# Patient Record
Sex: MALE | Race: WHITE | URBAN - NONMETROPOLITAN AREA
[De-identification: names, ages, dates, MRNs, and addresses within clinical notes are randomized per-mention and may not be internally consistent; named-entity substitution may affect disease eponyms.]

---

## 2019-02-13 LAB
ALBUMIN SERPL-MCNC: NORMAL G/DL
ALP BLD-CCNC: NORMAL U/L
ALT SERPL-CCNC: NORMAL U/L
ANION GAP SERPL CALCULATED.3IONS-SCNC: NORMAL MMOL/L
AST SERPL-CCNC: NORMAL U/L
BILIRUB SERPL-MCNC: NORMAL MG/DL
BUN BLDV-MCNC: NORMAL MG/DL
CALCIUM SERPL-MCNC: NORMAL MG/DL
CHLORIDE BLD-SCNC: NORMAL MMOL/L
CHOLESTEROL, TOTAL: NORMAL
CHOLESTEROL/HDL RATIO: NORMAL
CO2: NORMAL
CREAT SERPL-MCNC: NORMAL MG/DL
GFR CALCULATED: NORMAL
GLUCOSE BLD-MCNC: NORMAL MG/DL
HDLC SERPL-MCNC: NORMAL MG/DL
LDL CHOLESTEROL CALCULATED: NORMAL
POTASSIUM SERPL-SCNC: NORMAL MMOL/L
SODIUM BLD-SCNC: NORMAL MMOL/L
TOTAL PROTEIN: NORMAL
TRIGL SERPL-MCNC: NORMAL MG/DL
VLDLC SERPL CALC-MCNC: NORMAL MG/DL

## 2020-02-17 ENCOUNTER — CLINICAL DOCUMENTATION (OUTPATIENT)
Dept: FAMILY MEDICINE CLINIC | Age: 65
End: 2020-02-17

## 2020-02-17 NOTE — PROGRESS NOTES
Arben Mayorga Given : 1955 Sex: M Age: 58 years  Note created from [address] [place] [address] for Dr. Basurto Seat: Patient presents complete preventative examination. Recent extrinsic fall with rib fracture. Is doing a  lot better with those symptoms. Very minimal amount of chest wall discomfort. Denies any breathing  problems including shortness of breath cough or sputum production. He denies any hemoptysis. He  does have BPH symptomatology which is unchanged. Nocturia x1 or 2 at nighttime. No dysuria or  hematuria. No difficulty starting or stopping his stream. Patient's minimal amount of osteoarthritis of his  knees. Previously it was bothering him more but he was intentionally lost about 8 pounds over the last  year and this has been helpful. I encouraged more weight loss. Patient is getting close to [facility] and  we discussed post-[facility] plans. Patient refuses influenza vaccination but he does agree to a  Shingrix vaccination as his mother's recently suffer from shingles which has been very painful for her. HPI:  ROS:  Const: General health stated as excellent. Eyes: Denies eye symptoms. ENMT: Denies ear symptoms. Denies nasal symptoms. CV: Denies cardiovascular symptoms. Resp: Denies respiratory symptoms. GI: Denies gastrointestinal symptoms. : Minimal BPH symptomatology  Musculo: Reports arthritis. occasional knee pain especially with working on his knees such as crawling in  a duct. Skin: Skin care every 6 months by dermatology. Neuro: Denies neurologic symptoms. Psych: Denies psychiatric symptoms. Endocrine: Denies endocrine symptoms. Hema/Lymph: Denies hematologic symptoms. Denies lymphatic symptoms.   Current Meds: Doterra Supplement qd  Allergies: NKDA  PMH:  Problem List: Allergic contact dermatitis due to plants, except food  Health Maintenance:  Tetanus Immunization - (2019)  Colonoscopy - (2011)  Colonoscopy Screening - (2011)  Prostate Exam -

## 2020-02-19 ENCOUNTER — HOSPITAL ENCOUNTER (OUTPATIENT)
Age: 65
Discharge: HOME | End: 2020-02-21
Payer: COMMERCIAL

## 2020-02-19 ENCOUNTER — OFFICE VISIT (OUTPATIENT)
Dept: FAMILY MEDICINE CLINIC | Age: 65
End: 2020-02-19
Payer: COMMERCIAL

## 2020-02-19 VITALS
HEART RATE: 66 BPM | OXYGEN SATURATION: 93 % | BODY MASS INDEX: 28.44 KG/M2 | WEIGHT: 192 LBS | SYSTOLIC BLOOD PRESSURE: 132 MMHG | HEIGHT: 69 IN | DIASTOLIC BLOOD PRESSURE: 80 MMHG

## 2020-02-19 PROBLEM — M19.90 OSTEOARTHRITIS: Status: ACTIVE | Noted: 2020-02-19

## 2020-02-19 PROBLEM — Z80.42 FAMILY HX OF PROSTATE CANCER: Status: ACTIVE | Noted: 2020-02-19

## 2020-02-19 PROBLEM — Z12.5 PROSTATE CANCER SCREENING: Status: ACTIVE | Noted: 2020-02-19

## 2020-02-19 LAB — PROSTATE SPECIFIC ANTIGEN: 3.67 NG/ML (ref 0–4)

## 2020-02-19 PROCEDURE — 36415 COLL VENOUS BLD VENIPUNCTURE: CPT

## 2020-02-19 PROCEDURE — 99397 PER PM REEVAL EST PAT 65+ YR: CPT | Performed by: INTERNAL MEDICINE

## 2020-02-19 PROCEDURE — G0103 PSA SCREENING: HCPCS

## 2020-02-19 ASSESSMENT — PATIENT HEALTH QUESTIONNAIRE - PHQ9
SUM OF ALL RESPONSES TO PHQ QUESTIONS 1-9: 0
2. FEELING DOWN, DEPRESSED OR HOPELESS: 0
SUM OF ALL RESPONSES TO PHQ QUESTIONS 1-9: 0
1. LITTLE INTEREST OR PLEASURE IN DOING THINGS: 0
SUM OF ALL RESPONSES TO PHQ9 QUESTIONS 1 & 2: 0

## 2020-02-19 NOTE — PROGRESS NOTES
This is a patient who had told me his father had bladder cancer but in reality this was prostate cancer. PSA actually had climbed last year and so I will check this again along with repeating a prostate examination. Patient is vaccines are up-to-date but he once again refuses influenza vaccine. He really does not have a lot of symptomatology of BPH. He does have some arthritis of his knees. He has intentionally lost about 35 pounds over the last year. He broke up with a woman he was dating which was emotionally very difficult. He is denying depression because of this however. HPI:  ROS:  Const: General health stated as excellent. Eyes: Denies eye symptoms. ENMT: Denies ear symptoms. Denies nasal symptoms. CV: Denies cardiovascular symptoms. Resp: Denies respiratory symptoms. GI: Denies gastrointestinal symptoms. : Minimal BPH symptomatology  Musculo: Reports arthritis. occasional knee pain especially with working on his knees such as crawling in  a duct. Skin: Skin care every 6 months by dermatology. Neuro: Denies neurologic symptoms. Psych: Denies psychiatric symptoms. Endocrine: Denies endocrine symptoms. Hema/Lymph: Denies hematologic symptoms. Denies lymphatic symptoms. No current outpatient medications on file.   Allergies: NKDA  PMH:  Problem List: Allergic contact dermatitis due to plants, except food  Health Maintenance:  Tetanus Immunization - (1/26/2019)  Colonoscopy - (2/28/2011)  Colonoscopy Screening - (2/28/2011)  Prostate Exam - (2/19/2020))  Psa Test - (2/19/2020)  Physical Exam - (2/19/2020)  Colonoscopy - 2011 NORMAL- [facility]  Rectal Exam - (2/19/2020)  Tdap - (2010)  Shingrix Vaccine (Shingles) - (2/13/2019) [address]  Dermatology Surveillance - (2/19/2020) EVERY 6 MONTHS  Negative For Influenza Vaccination - (2/19/2020)) REFUSES  Childhood Illnesses:  Meningitis - as baby  Medical Problems:  Osteoarthritis - KNEES  Benign Prostatic Hypertrophy  Extrinsic Fall With Right Rib Fracture - (2019)  Surgical Hx:  Vasectomy  Reviewed and updated. FH:  Father:  Prostate cancer  Mother:  Obesity, Breast Cancer. Reviewed, no changes. SH:  Marital: Legal Status:  - . Personal Habits: Cigarette Use: Never Smoked Cigarettes. Alcohol: Rarely consumes alcohol. Daily  Caffeine: Consumes on average 3 cups of regular coffee per day. Exercise Type: Exercises  sporadically. Reviewed, no changes. Date: 2019  Was the patient queried about smoking behavior? Yes No  Does the patient currently smoke? Smoking: Patient has never smoked. Objective  BP: 122/80 Pulse: 72 Ht: 69\" 5'9\" Ht cm: 175.3 Wt: 222lb Wt Prior: 230lb as of 19 Wt Dif: -8lb Wt  k.699 Wt kg Prior: 104. 328 as of 19 Wt kg Dif: -3.629 BMI: 32.8 BSA: 2.16  Exam:  Const: Appears healthy, well developed and well nourished. Appears obese. Eyes: EOMI in both eyes. PERRL. ENMT: External ears WNL. Tympanic membranes are intact. External nose WNL. Moistness and  normal color of the nasal mucosae. Septum is in the midline. Dentition is in good repair. Gums  appear healthy. Posterior pharynx shows no exudate, irritation or redness. Neck: Supple and symmetric. Palpation reveals no adenopathy. No masses appreciated. Thyroid  exhibits no nodule or thyromegaly. No JVD. Resp: Respirations are unlabored. Respiration rate is normal. Auscultate good airflow. No rales,  rhonchi or wheezes appreciated over the lungs bilaterally. CV: Rhythm is regular. S1 is normal. S2 is normal. Carotids: no bruits. Abdominal aorta: not  palpable. Pedal pulses: 2+ and equal bilaterally. Extremities: No clubbing, cyanosis or edema. Abdomen: Bowel sounds are normoactive. Palpation reveals softness, with no distension,  organomegaly or tenderness. No abdominal masses palpable. No palpable hepatosplenomegaly. : Testes are normal on palpation. Prostate: Moderately enlarged, symmetric, firm and nontender. Hematest: negative.   Musculo: Walks with a normal gait. Upper Extremities: Full ROM bilaterally. Lower Extremities:  Crepitation of the lower extremities. Skin: Dry and warm with no rash. Neuro: Alert and oriented x3. Mood is normal. Affect is normal. Speech is articulate and fluent. Reflexes: DTR's are symmetric, intact and 2+ bilaterally. Psych: Patient's attitude is cooperative. Patient's affect is appropriate. Judgement is realistic. Insight  is appropriate. QUINN was seen today for annual exam.    Diagnoses and all orders for this visit:    Prostate cancer screening  -     PSA SCREENING; Future    Primary osteoarthritis of both knees    Family hx of prostate cancer  -     PSA SCREENING; Future    The patient will have PSA level today. The patient is to be seen back in 1 year. If his PSA level rises significantly then I would suggest urologic evaluation and I did discuss this with him. Patient arthritic symptoms are well controlled especially with this weight loss. I did go over his labs from last year and there is really no need to repeat lipids at this point especially with this impressive weight loss.

## 2020-03-05 ENCOUNTER — TELEPHONE (OUTPATIENT)
Dept: FAMILY MEDICINE CLINIC | Age: 65
End: 2020-03-05

## 2020-03-20 PROBLEM — Z12.5 PROSTATE CANCER SCREENING: Status: RESOLVED | Noted: 2020-02-19 | Resolved: 2020-03-20

## 2020-10-27 ENCOUNTER — OFFICE VISIT (OUTPATIENT)
Dept: FAMILY MEDICINE CLINIC | Age: 65
End: 2020-10-27
Payer: COMMERCIAL

## 2020-10-27 VITALS
TEMPERATURE: 97.8 F | HEART RATE: 85 BPM | DIASTOLIC BLOOD PRESSURE: 80 MMHG | SYSTOLIC BLOOD PRESSURE: 130 MMHG | OXYGEN SATURATION: 97 % | HEIGHT: 70 IN | WEIGHT: 211 LBS | RESPIRATION RATE: 18 BRPM | BODY MASS INDEX: 30.21 KG/M2

## 2020-10-27 PROCEDURE — 99213 OFFICE O/P EST LOW 20 MIN: CPT | Performed by: PHYSICIAN ASSISTANT

## 2020-10-27 RX ORDER — CEPHALEXIN 500 MG/1
500 CAPSULE ORAL 3 TIMES DAILY
Qty: 21 CAPSULE | Refills: 0 | Status: SHIPPED | OUTPATIENT
Start: 2020-10-27 | End: 2020-11-03
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
## 2021-03-31 ENCOUNTER — OFFICE VISIT (OUTPATIENT)
Dept: FAMILY MEDICINE CLINIC | Age: 66
End: 2021-03-31
Payer: COMMERCIAL

## 2021-03-31 VITALS
DIASTOLIC BLOOD PRESSURE: 80 MMHG | OXYGEN SATURATION: 95 % | WEIGHT: 211 LBS | BODY MASS INDEX: 30.71 KG/M2 | HEART RATE: 65 BPM | TEMPERATURE: 98.5 F | SYSTOLIC BLOOD PRESSURE: 132 MMHG

## 2021-03-31 VITALS
OXYGEN SATURATION: 95 % | BODY MASS INDEX: 31.15 KG/M2 | WEIGHT: 214 LBS | DIASTOLIC BLOOD PRESSURE: 80 MMHG | SYSTOLIC BLOOD PRESSURE: 132 MMHG | HEART RATE: 65 BPM

## 2021-03-31 DIAGNOSIS — Z80.42 FAMILY HX OF PROSTATE CANCER: ICD-10-CM

## 2021-03-31 DIAGNOSIS — M17.0 PRIMARY OSTEOARTHRITIS OF BOTH KNEES: ICD-10-CM

## 2021-03-31 DIAGNOSIS — Z00.00 ROUTINE GENERAL MEDICAL EXAMINATION AT A HEALTH CARE FACILITY: Primary | ICD-10-CM

## 2021-03-31 DIAGNOSIS — Z12.11 COLON CANCER SCREENING: ICD-10-CM

## 2021-03-31 DIAGNOSIS — Z00.00 ROUTINE GENERAL MEDICAL EXAMINATION AT A HEALTH CARE FACILITY: ICD-10-CM

## 2021-03-31 LAB
CHOLESTEROL, TOTAL: 148 MG/DL (ref 0–199)
HDLC SERPL-MCNC: 51 MG/DL
LDL CHOLESTEROL CALCULATED: 84 MG/DL (ref 0–99)
PROSTATE SPECIFIC ANTIGEN: 5.96 NG/ML (ref 0–4)
TRIGL SERPL-MCNC: 66 MG/DL (ref 0–149)
VLDLC SERPL CALC-MCNC: 13 MG/DL

## 2021-03-31 PROCEDURE — 99397 PER PM REEVAL EST PAT 65+ YR: CPT | Performed by: INTERNAL MEDICINE

## 2021-03-31 PROCEDURE — G0438 PPPS, INITIAL VISIT: HCPCS | Performed by: INTERNAL MEDICINE

## 2021-03-31 ASSESSMENT — LIFESTYLE VARIABLES
HOW MANY STANDARD DRINKS CONTAINING ALCOHOL DO YOU HAVE ON A TYPICAL DAY: 0
HOW OFTEN DO YOU HAVE SIX OR MORE DRINKS ON ONE OCCASION: 0
AUDIT TOTAL SCORE: 1
HOW OFTEN DURING THE LAST YEAR HAVE YOU FAILED TO DO WHAT WAS NORMALLY EXPECTED FROM YOU BECAUSE OF DRINKING: 0
HOW OFTEN DO YOU HAVE A DRINK CONTAINING ALCOHOL: 1
HOW OFTEN DURING THE LAST YEAR HAVE YOU BEEN UNABLE TO REMEMBER WHAT HAPPENED THE NIGHT BEFORE BECAUSE YOU HAD BEEN DRINKING: 0
HOW OFTEN DURING THE LAST YEAR HAVE YOU FOUND THAT YOU WERE NOT ABLE TO STOP DRINKING ONCE YOU HAD STARTED: 0
HAS A RELATIVE, FRIEND, DOCTOR, OR ANOTHER HEALTH PROFESSIONAL EXPRESSED CONCERN ABOUT YOUR DRINKING OR SUGGESTED YOU CUT DOWN: 0

## 2021-03-31 ASSESSMENT — PATIENT HEALTH QUESTIONNAIRE - PHQ9
SUM OF ALL RESPONSES TO PHQ QUESTIONS 1-9: 0
2. FEELING DOWN, DEPRESSED OR HOPELESS: 0
SUM OF ALL RESPONSES TO PHQ QUESTIONS 1-9: 0
SUM OF ALL RESPONSES TO PHQ9 QUESTIONS 1 & 2: 0

## 2021-03-31 NOTE — PATIENT INSTRUCTIONS
Personalized Preventive Plan for Jony Rodriguez - 3/31/2021  Medicare offers a range of preventive health benefits. Some of the tests and screenings are paid in full while other may be subject to a deductible, co-insurance, and/or copay. Some of these benefits include a comprehensive review of your medical history including lifestyle, illnesses that may run in your family, and various assessments and screenings as appropriate. After reviewing your medical record and screening and assessments performed today your provider may have ordered immunizations, labs, imaging, and/or referrals for you. A list of these orders (if applicable) as well as your Preventive Care list are included within your After Visit Summary for your review. Other Preventive Recommendations:    · A preventive eye exam performed by an eye specialist is recommended every 1-2 years to screen for glaucoma; cataracts, macular degeneration, and other eye disorders. · A preventive dental visit is recommended every 6 months. · Try to get at least 150 minutes of exercise per week or 10,000 steps per day on a pedometer . · Order or download the FREE \"Exercise & Physical Activity: Your Everyday Guide\" from [employer]. Call 8-266.674.3805 or search [employer] online. · You need 6017-7058 mg of calcium and 9661-0634 IU of vitamin D per day. It is possible to meet your calcium requirement with diet alone, but a vitamin D supplement is usually necessary to meet this goal.  · When exposed to the sun, use a sunscreen that protects against both UVA and UVB radiation with an SPF of 30 or greater. Reapply every 2 to 3 hours or after sweating, drying off with a towel, or swimming. · Always wear a seat belt when traveling in a car. Always wear a helmet when riding a bicycle or motorcycle.

## 2021-03-31 NOTE — PROGRESS NOTES
Patient's father had prostate cancer. Patient has slight BPH symptomatology including some nocturia x2. Denies any difficulty starting or stopping his stream.  Some arthritis but this is fairly minimal.  He does see dermatology every 6 months. He has had quite a lot of sun exposure. Patient's daughter just had a baby and he was questioning his Tdap status. He did have 1 January 26, 2019. Colonoscopy is due this year as the patient has been 10 years since his last examination which was normal.  I will refer him back to Dr. Rula Acuña. HPI:  ROS:  Const: General health stated as excellent. Eyes: Denies eye symptoms. ENMT: Denies ear symptoms. Denies nasal symptoms. CV: Denies cardiovascular symptoms. Resp: Denies respiratory symptoms. GI: Denies gastrointestinal symptoms. : Minimal BPH symptomatology  Musculo: Reports arthritis. occasional knee pain especially with working on his knees such as crawling in  a duct. Skin: Skin care every 6 months by dermatology. Neuro: Denies neurologic symptoms. Psych: Denies psychiatric symptoms. Endocrine: Denies endocrine symptoms. Hema/Lymph: Denies hematologic symptoms. Denies lymphatic symptoms. Current Outpatient Medications:     mupirocin (BACTROBAN) 2 % ointment, Apply 3 times daily. , Disp: 30 g, Rfl: 0  Allergies: NKDA  PMH:  Problem List: Allergic contact dermatitis due to plants, except food  Health Maintenance:  Tetanus Immunization - (1/26/2019)  Colonoscopy - (2/28/2011)  Colonoscopy Screening - (2/28/2011)  Prostate Exam - (3/31/2021)  Psa Test - (3/31/2021)  Physical Exam - (2/19/2020)  Colonoscopy - 2011 NORMAL- [facility]  Rectal Exam - (3/31/2021)  Shingrix Vaccine (Shingles) - (3/31/2021) discussed  Dermatology Surveillance - (2/19/2020) EVERY 6 MONTHS  Negative For Influenza Vaccination - (3/31/2021) REFUSES  COVID vaccine- discussed 3/31/2021  Childhood Illnesses:  Meningitis - as baby  Medical Problems:  Osteoarthritis - KNEES  Benign Prostatic Affect is normal. Speech is articulate and fluent. Reflexes: DTR's are symmetric, intact and 2+ bilaterally. Psych: Patient's attitude is cooperative. Patient's affect is appropriate. Judgement is realistic. Insight  is appropriate. Veronica Cervantes was seen today for annual exam.    Diagnoses and all orders for this visit:    Routine general medical examination at a health care facility    Primary osteoarthritis of both knees    Family hx of prostate cancer    Patient is to have lab work today. His Tdap is up-to-date in terms of his exposure to grandchildren. Referral to Dr. Boyd . Let him know the results of this testing. Patient is to be scheduled in 1 year for another preventative visit.

## 2021-03-31 NOTE — PROGRESS NOTES
Medicare Annual Wellness Visit  Name: Dameon Galindo Date: 3/31/2021   MRN: <[id number]> Sex: Male   Age: 78 y.o. Ethnicity: Non-/Non    : 1955 Race: Adelina Riedel Given is here for Medicare AWV    Screenings for behavioral, psychosocial and functional/safety risks, and cognitive dysfunction are all negative except as indicated below. These results, as well as other patient data from the [address] form, are documented in Flowsheets linked to this Encounter. No Known Allergies    Prior to Visit Medications    Medication Sig Taking? Authorizing Provider   mupirocin (BACTROBAN) 2 % ointment Apply 3 times daily. Patient not taking: Reported on 3/31/2021  SARAH Gamez III       Past Medical History:   Diagnosis Date    Osteoarthritis     knees    Rib fracture 2019    right        Past Surgical History:   Procedure Laterality Date    VASECTOMY         Family History   Problem Relation Age of Onset    Obesity Mother     Breast Cancer Mother     Cancer Father         bladder       CareTeam (Including outside providers/suppliers regularly involved in providing care):   Patient Care Team:  Dimitris Hitchcock MD as PCP - General (Internal Medicine)  Dimitris Hitchcock MD as PCP - [address] [place], [place] [address]kelsy Ramírezled Provider    Wt Readings from Last 3 Encounters:   21 211 lb (95.7 kg)   21 214 lb (97.1 kg)   10/27/20 211 lb (95.7 kg)     Vitals:    21 1519   BP: 132/80   Pulse: 65   SpO2: 95%   Weight: 214 lb (97.1 kg)     Body mass index is 31.15 kg/m². Based upon direct observation of the patient, evaluation of cognition reveals recent and remote memory intact. Patient's complete Health Risk Assessment and screening values have been reviewed and are found in Flowsheets. The following problems were reviewed today and where indicated follow up appointments were made and/or referrals ordered.     Positive Risk Factor Screenings with Interventions:          General Health and ACP:  General  In general, how would you say your health is?: Excellent  In the past 7 days, have you experienced any of the following?  New or Increased Pain, New or Increased Fatigue, Loneliness, Social Isolation, Stress or Anger?: None of These  Do you get the social and emotional support that you need?: Yes  Do you have a Living Will?: Yes  Advance Directives     Power of Carmenza Hinson Will ACP-Advance Directive ACP-Power of     Not on File Not on File Not on File Not on File      General Health Risk Interventions:  · Has living will    Health Habits/Nutrition:  Health Habits/Nutrition  Do you exercise for at least 20 minutes 2-3 times per week?: Yes  Have you lost any weight without trying in the past 3 months?: No  Do you eat only one meal per day?: No  Have you seen the dentist within the past year?: Yes     Health Habits/Nutrition Interventions:  · very strenuos job     Safety:  Safety  Do you have working smoke detectors?: Yes  Have all throw rugs been removed or fastened?: (!) No(no throw rugs)  Do you have non-slip mats or surfaces in all bathtubs/showers?: (!) No  Do all of your stairways have a railing or banister?: Yes  Are your doorways, halls and stairs free of clutter?: Yes  Do you always fasten your seatbelt when you are in a car?: Yes  Safety Interventions:  · Home safety tips provided     Personalized Preventive Plan   Current Health Maintenance Status  Immunization History   Administered Date(s) Administered    Tdap (Boostrix, Adacel) 08/31/2010, 01/26/2019        Health Maintenance   Topic Date Due    Hepatitis C screen  Never done    COVID-19 Vaccine (1) Never done    Diabetes screen  Never done    Shingles Vaccine (1 of 2) Never done    Pneumococcal 65+ years Vaccine (1 of 1 - PPSV23) Never done    Flu vaccine (1) Never done    Colon cancer screen colonoscopy  02/28/2021    Lipid screen  02/13/2024    DTaP/Tdap/Td vaccine (3 - Td) 01/26/2029    Hepatitis A vaccine  Aged Out    Hepatitis B vaccine  Aged Out    Hib vaccine  Aged Out    Meningococcal (ACWY) vaccine  Aged Out     Recommendations for [email or web address] Due: see orders and patient instructions/AVS.  . Recommended screening schedule for the next 5-10 years is provided to the patient in written form: see Patient Instructions/AVS.    There are no diagnoses linked to this encounter.

## 2021-04-01 DIAGNOSIS — Z80.42 FAMILY HX OF PROSTATE CANCER: ICD-10-CM

## 2021-04-01 DIAGNOSIS — R97.20 PSA ELEVATION: Primary | ICD-10-CM

## 2021-04-30 PROBLEM — Z00.00 ROUTINE GENERAL MEDICAL EXAMINATION AT A HEALTH CARE FACILITY: Status: RESOLVED | Noted: 2020-02-19 | Resolved: 2021-04-30

## 2021-06-17 ENCOUNTER — HOSPITAL ENCOUNTER (OUTPATIENT)
Age: 66
Discharge: HOME | End: 2021-06-19

## 2021-06-17 PROCEDURE — 88305 TISSUE EXAM BY PATHOLOGIST: CPT

## 2022-05-11 ENCOUNTER — OFFICE VISIT (OUTPATIENT)
Dept: FAMILY MEDICINE CLINIC | Age: 67
End: 2022-05-11
Payer: COMMERCIAL

## 2022-05-11 VITALS
HEART RATE: 63 BPM | OXYGEN SATURATION: 97 % | DIASTOLIC BLOOD PRESSURE: 60 MMHG | TEMPERATURE: 98.1 F | SYSTOLIC BLOOD PRESSURE: 110 MMHG | BODY MASS INDEX: 31.7 KG/M2 | WEIGHT: 217.8 LBS

## 2022-05-11 DIAGNOSIS — E66.09 CLASS 1 OBESITY DUE TO EXCESS CALORIES WITHOUT SERIOUS COMORBIDITY WITH BODY MASS INDEX (BMI) OF 31.0 TO 31.9 IN ADULT: ICD-10-CM

## 2022-05-11 DIAGNOSIS — C61 PROSTATE CANCER (HCC): ICD-10-CM

## 2022-05-11 DIAGNOSIS — Z12.11 COLON CANCER SCREENING: ICD-10-CM

## 2022-05-11 DIAGNOSIS — M17.0 PRIMARY OSTEOARTHRITIS OF BOTH KNEES: Primary | ICD-10-CM

## 2022-05-11 PROBLEM — E66.811 CLASS 1 OBESITY DUE TO EXCESS CALORIES WITHOUT SERIOUS COMORBIDITY WITH BODY MASS INDEX (BMI) OF 31.0 TO 31.9 IN ADULT: Status: ACTIVE | Noted: 2022-05-11

## 2022-05-11 LAB
ALBUMIN SERPL-MCNC: 4.3 G/DL (ref 3.5–5.2)
ALP BLD-CCNC: 97 U/L (ref 40–129)
ALT SERPL-CCNC: 20 U/L (ref 0–40)
ANION GAP SERPL CALCULATED.3IONS-SCNC: 13 MMOL/L (ref 7–16)
AST SERPL-CCNC: 20 U/L (ref 0–39)
BASOPHILS ABSOLUTE: 0.08 E9/L (ref 0–0.2)
BASOPHILS RELATIVE PERCENT: 1.4 % (ref 0–2)
BILIRUB SERPL-MCNC: 0.5 MG/DL (ref 0–1.2)
BUN BLDV-MCNC: 14 MG/DL (ref 6–23)
CALCIUM SERPL-MCNC: 9.6 MG/DL (ref 8.6–10.2)
CHLORIDE BLD-SCNC: 108 MMOL/L (ref 98–107)
CHOLESTEROL, TOTAL: 150 MG/DL (ref 0–199)
CO2: 25 MMOL/L (ref 22–29)
CREAT SERPL-MCNC: 0.9 MG/DL (ref 0.7–1.2)
EOSINOPHILS ABSOLUTE: 0.25 E9/L (ref 0.05–0.5)
EOSINOPHILS RELATIVE PERCENT: 4.3 % (ref 0–6)
GFR AFRICAN AMERICAN: >60
GFR NON-AFRICAN AMERICAN: >60 ML/MIN/1.73
GLUCOSE BLD-MCNC: 83 MG/DL (ref 74–99)
HCT VFR BLD CALC: 48.3 % (ref 37–54)
HDLC SERPL-MCNC: 55 MG/DL
HEMOGLOBIN: 15.1 G/DL (ref 12.5–16.5)
IMMATURE GRANULOCYTES #: 0.02 E9/L
IMMATURE GRANULOCYTES %: 0.3 % (ref 0–5)
LDL CHOLESTEROL CALCULATED: 86 MG/DL (ref 0–99)
LYMPHOCYTES ABSOLUTE: 1.31 E9/L (ref 1.5–4)
LYMPHOCYTES RELATIVE PERCENT: 22.4 % (ref 20–42)
MCH RBC QN AUTO: 28.3 PG (ref 26–35)
MCHC RBC AUTO-ENTMCNC: 31.3 % (ref 32–34.5)
MCV RBC AUTO: 90.6 FL (ref 80–99.9)
MONOCYTES ABSOLUTE: 0.86 E9/L (ref 0.1–0.95)
MONOCYTES RELATIVE PERCENT: 14.7 % (ref 2–12)
NEUTROPHILS ABSOLUTE: 3.33 E9/L (ref 1.8–7.3)
NEUTROPHILS RELATIVE PERCENT: 56.9 % (ref 43–80)
PDW BLD-RTO: 13.2 FL (ref 11.5–15)
PLATELET # BLD: 338 E9/L (ref 130–450)
PMV BLD AUTO: 9.3 FL (ref 7–12)
POTASSIUM SERPL-SCNC: 4.6 MMOL/L (ref 3.5–5)
PROSTATE SPECIFIC ANTIGEN: 0.84 NG/ML (ref 0–4)
RBC # BLD: 5.33 E12/L (ref 3.8–5.8)
SODIUM BLD-SCNC: 146 MMOL/L (ref 132–146)
TOTAL PROTEIN: 7.1 G/DL (ref 6.4–8.3)
TRIGL SERPL-MCNC: 47 MG/DL (ref 0–149)
VLDLC SERPL CALC-MCNC: 9 MG/DL
WBC # BLD: 5.9 E9/L (ref 4.5–11.5)

## 2022-05-11 PROCEDURE — 99214 OFFICE O/P EST MOD 30 MIN: CPT | Performed by: INTERNAL MEDICINE

## 2022-05-11 ASSESSMENT — PATIENT HEALTH QUESTIONNAIRE - PHQ9
2. FEELING DOWN, DEPRESSED OR HOPELESS: 0
SUM OF ALL RESPONSES TO PHQ9 QUESTIONS 1 & 2: 0
1. LITTLE INTEREST OR PLEASURE IN DOING THINGS: 0
SUM OF ALL RESPONSES TO PHQ QUESTIONS 1-9: 0

## 2022-05-11 ASSESSMENT — LIFESTYLE VARIABLES
HOW MANY STANDARD DRINKS CONTAINING ALCOHOL DO YOU HAVE ON A TYPICAL DAY: 1 OR 2
HOW OFTEN DO YOU HAVE A DRINK CONTAINING ALCOHOL: MONTHLY OR LESS

## 2022-05-11 NOTE — PROGRESS NOTES
Patient was diagnosed with prostate cancer last fall. He was then seen at the [facility]. He had brachytherapy done. He has not had a repeat PSA level. He does have a little bit of irritative bladder and bowel symptomatology status post brachytherapy. He does have a sense at times of incomplete emptying. He tried to back off Flomax but this worsened the problem. I told him to go back on Flomax at least temporarily. I told him that he could try to go off of it several months from now to see if he still continues to have a sensation of incomplete emptying. He has had no blood in his stool. He states occasionally when he urinates she does feel like he is going to have a bowel movement. Patient recently retired. His mother recently . He is traveling to [place] by motorcycle in the near future. He is denying cardiac or respiratory symptoms. He is very physically active. HPI:  ROS:  Const: General health stated as good. Eyes: Denies eye symptoms. ENMT: Denies ear symptoms. Denies nasal symptoms. CV: Denies cardiovascular symptoms. Resp: Denies respiratory symptoms. GI: Denies gastrointestinal symptoms. : Prostate cancer. See HPI. Radiation oncology notes reviewed from [place] clinic. Musculo: Reports arthritis. occasional knee pain especially with working on his knees such as crawling in  a duct. Skin: Skin care every 6 months by dermatology. Neuro: Denies neurologic symptoms. Psych: Denies psychiatric symptoms. Endocrine: Denies endocrine symptoms. Hema/Lymph: Denies hematologic symptoms. Denies lymphatic symptoms. No current outpatient medications on file.   Allergies: NKDA  PMH:  Problem List: Allergic contact dermatitis due to plants, except food  Health Maintenance:  Tetanus Immunization - (2019)  Colonoscopy - (2011) referred back 2022  Colonoscopy Screening - (2011)  Prostate Exam - () CC  Psa Test - (2022)  Physical Exam - (2022)  Colonoscopy -  NORMAL- LEBRON  Rectal Exam - (3/31/2021)  Shingrix Vaccine (Shingles) - (3/31/2021) discussed  Dermatology Surveillance - (2020) EVERY 6 MONTHS  Negative For Influenza Vaccination - (3/31/2021) REFUSES  COVID vaccine-   Childhood Illnesses:  Meningitis - as baby  Medical Problems:  Osteoarthritis - KNEES  Benign Prostatic Hypertrophy  Extrinsic Fall With Right Rib Fracture - (2019)  Prostate cancer-10/2021- [facility] Brachytherapy  Surgical Hx:  Vasectomy  Prostate biopsy   Reviewed and updated. FH:  Father:  Prostate cancer  Mother:  Obesity, Breast Cancer.    Reviewed, no changes. SH:  Marital: Legal Status:  - . Four children  Personal Habits: Cigarette Use: Never Smoked Cigarettes. Alcohol: Rarely consumes alcohol. Daily  Caffeine: Consumes on average 3 cups of regular coffee per day. Exercise Type: Exercises  sporadically. Physically demanding job. Reviewed, no changes. Date: 2022  Was the patient queried about smoking behavior? Yes   Does the patient currently smoke? Smoking: Patient has never smoked. Objective  Vitals:    22 0704   BP: 110/60   Pulse: 63   Temp: 98.1 °F (36.7 °C)   SpO2: 97%     Exam:  Const: Appears healthy, well developed and well nourished. Appears obese. Eyes: EOMI in both eyes. PERRL. ENMT: External ears WNL. Tympanic membranes are intact. External nose WNL. Neck: Supple and symmetric. Palpation reveals no adenopathy. No masses appreciated. Thyroid  exhibits no nodule or thyromegaly. No JVD. Resp: Respirations are unlabored. Respiration rate is normal. Auscultate good airflow. No rales,  rhonchi or wheezes appreciated over the lungs bilaterally. CV: Rhythm is regular. S1 is normal. S2 is normal. Carotids: no bruits. Abdominal aorta: not  palpable. Pedal pulses: 2+ and equal bilaterally. Extremities: No clubbing, cyanosis or edema. Abdomen: Bowel sounds are normoactive.  Palpation reveals softness, with no distension,  organomegaly or tenderness. No abdominal masses palpable. No palpable hepatosplenomegaly. Musculo: Walks with a normal gait. Upper Extremities: Full ROM bilaterally. Lower Extremities:  Crepitation of the lower extremities. Skin: Dry and warm with no rash. Neuro: Alert and oriented x3. Mood is normal. Affect is normal. Speech is articulate and fluent. Reflexes: DTR's are symmetric, intact and 2+ bilaterally. Psych: Patient's attitude is cooperative. Patient's affect is appropriate. Judgement is realistic. Insight  is appropriate. [facility] was seen today for annual exam.    Diagnoses and all orders for this visit:    Primary osteoarthritis of both knees    Prostate cancer ([facility] [address]  -     PSA, DIAGNOSTIC; Future  -     CBC with Auto Differential; Future    Class 1 obesity due to excess calories without serious comorbidity with body mass index (BMI) of 31.0 to 31.9 in adult  -     Comprehensive Metabolic Panel; Future  -     Lipid Panel; Future    Colon cancer screening  -     CBC with Auto Differential; Future  -     Amb External Referral To General Surgery    Patient is to have lab work today. This will be sent to his radiation oncologist at the [facility]. I will let him know the results of this testing. Patient has gained a substantial amount of weight over the last 2 years. I have asked him to diet over the next year to lose 10 or 15 pounds. The patient will also be referred back to Dr. Krzysztof Michael for repeat colonoscopy which is overdue.

## 2022-05-11 NOTE — PROGRESS NOTES
Medicare Annual Wellness Visit  Name: Fred Castillo Date: 2022   MRN: [id number] Sex: Male   Age: 75 y.o. Ethnicity: Non- / Non    : 1955 Race: White (non-)      Roz Rodriguez is here for Annual Exam    Screenings for behavioral, psychosocial and functional/safety risks, and cognitive dysfunction are all negative except as indicated below. These results, as well as other patient data from the [address] form, are documented in Flowsheets linked to this Encounter. No Known Allergies      Prior to Visit Medications    Not on File         Past Medical History:   Diagnosis Date    Osteoarthritis     knees    Rib fracture 2019    right        Past Surgical History:   Procedure Laterality Date    VASECTOMY           Family History   Problem Relation Age of Onset    Obesity Mother     Breast Cancer Mother     Cancer Father         bladder       CareTeam (Including outside providers/suppliers regularly involved in providing care):   Patient Care Team:  Geri Sandoval MD as PCP - General (Internal Medicine)  Geri Sandoval MD as PCP - [facility] Provider    Wt Readings from Last 3 Encounters:   22 217 lb 12.8 oz (98.8 kg)   21 211 lb (95.7 kg)   21 214 lb (97.1 kg)     Vitals:    22 0704   BP: 110/60   Pulse: 63   Temp: 98.1 °F (36.7 °C)   SpO2: 97%   Weight: 217 lb 12.8 oz (98.8 kg)     Body mass index is 31.7 kg/m². Based upon direct observation of the patient, evaluation of cognition reveals recent and remote memory intact. Patient's complete Health Risk Assessment and screening values have been reviewed and are found in Flowsheets. The following problems were reviewed today and where indicated follow up appointments were made and/or referrals ordered.     Positive Risk Factor Screenings with Interventions:            General Health and ACP:       Advance Directives     Power of  Living Will ACP-Advance Directive ACP-Power of     Not on File Not on File Not on File Not on File      General Health Risk Interventions:  · Has living will    Health Habits/Nutrition:     Physical Activity:     Days of Exercise per Week: Not on file    Minutes of Exercise per Session: Not on file              Health Habits/Nutrition Interventions:  · very strenuos job    Safety Interventions:  · Home safety tips provided     Personalized Preventive Plan   Current Health Maintenance Status  Immunization History   Administered Date(s) Administered    Tdap (Boostrix, Adacel) 08/31/2010, 01/26/2019        Health Maintenance   Topic Date Due    COVID-19 Vaccine (1) Never done    Hepatitis C screen  Never done    Shingles vaccine (1 of 2) Never done    Pneumococcal 65+ years Vaccine (1 - PCV) Never done    Colorectal Cancer Screen  02/28/2021    Depression Screen  03/31/2022    Prostate Specific Antigen (PSA) Screening or Monitoring  03/31/2022    Flu vaccine (Season Ended) 09/01/2022    Lipids  03/31/2026    DTaP/Tdap/Td vaccine (3 - Td or Tdap) 01/26/2029    Hepatitis A vaccine  Aged Out    Hepatitis B vaccine  Aged Out    Hib vaccine  Aged Out    Meningococcal (ACWY) vaccine  Aged Out     Recommendations for [employer] Due: see orders and patient instructions/AVS.  . Recommended screening schedule for the next 5-10 years is provided to the patient in written form: see Patient Instructions/AVS.    {There are no diagnoses linked to this encounter.  (Refresh or delete this SmartLink)}

## 2022-06-02 ENCOUNTER — TELEPHONE (OUTPATIENT)
Dept: FAMILY MEDICINE CLINIC | Age: 67
End: 2022-06-02

## 2023-01-24 ENCOUNTER — TELEPHONE (OUTPATIENT)
Dept: FAMILY MEDICINE CLINIC | Age: 68
End: 2023-01-24

## 2023-01-24 NOTE — TELEPHONE ENCOUNTER
Spoke with patient, still following with [facility]. He has 6mo follow up in Feb. Will call and double check with them that they want Dr Carmen Hanna to order then call back.

## 2023-01-24 NOTE — TELEPHONE ENCOUNTER
----- Message from Ryanne Guerrero sent at 1/24/2023  9:21 AM EST -----  Subject: Referral Request    Reason for referral request? PSA blood work for prostate cancer screening  Provider patient wants to be referred to(if known):     Provider Phone Number(if known): Additional Information for Provider? Please call to advise that order is   ready.   ---------------------------------------------------------------------------  --------------  Martina COOL    [phone]; OK to leave message on voicemail  ---------------------------------------------------------------------------  --------------

## 2023-02-03 LAB — PROSTATE SPECIFIC ANTIGEN: 0.2 NG/ML (ref 0–4)

## 2023-05-16 ENCOUNTER — OFFICE VISIT (OUTPATIENT)
Dept: FAMILY MEDICINE CLINIC | Age: 68
End: 2023-05-16
Payer: MEDICARE

## 2023-05-16 VITALS
HEART RATE: 71 BPM | TEMPERATURE: 97.8 F | OXYGEN SATURATION: 98 % | WEIGHT: 228 LBS | BODY MASS INDEX: 33.19 KG/M2 | SYSTOLIC BLOOD PRESSURE: 120 MMHG | DIASTOLIC BLOOD PRESSURE: 80 MMHG

## 2023-05-16 DIAGNOSIS — R71.8 MICROCYTOSIS: ICD-10-CM

## 2023-05-16 DIAGNOSIS — M17.0 PRIMARY OSTEOARTHRITIS OF BOTH KNEES: Primary | ICD-10-CM

## 2023-05-16 DIAGNOSIS — Z00.00 MEDICARE ANNUAL WELLNESS VISIT, SUBSEQUENT: Primary | ICD-10-CM

## 2023-05-16 DIAGNOSIS — C61 PROSTATE CANCER (HCC): ICD-10-CM

## 2023-05-16 DIAGNOSIS — Z00.00 ENCOUNTER FOR SUBSEQUENT ANNUAL WELLNESS VISIT IN MEDICARE PATIENT: ICD-10-CM

## 2023-05-16 DIAGNOSIS — E66.09 CLASS 1 OBESITY DUE TO EXCESS CALORIES WITHOUT SERIOUS COMORBIDITY WITH BODY MASS INDEX (BMI) OF 31.0 TO 31.9 IN ADULT: ICD-10-CM

## 2023-05-16 DIAGNOSIS — Z00.00 WELCOME TO MEDICARE PREVENTIVE VISIT: ICD-10-CM

## 2023-05-16 LAB
BASOPHILS # BLD: 0.09 E9/L (ref 0–0.2)
BASOPHILS NFR BLD: 1.4 % (ref 0–2)
EOSINOPHIL # BLD: 0.23 E9/L (ref 0.05–0.5)
EOSINOPHIL NFR BLD: 3.5 % (ref 0–6)
ERYTHROCYTE [DISTWIDTH] IN BLOOD BY AUTOMATED COUNT: 13.7 FL (ref 11.5–15)
HCT VFR BLD AUTO: 48.8 % (ref 37–54)
HGB BLD-MCNC: 15 G/DL (ref 12.5–16.5)
IMM GRANULOCYTES # BLD: 0.03 E9/L
IMM GRANULOCYTES NFR BLD: 0.5 % (ref 0–5)
LYMPHOCYTES # BLD: 1.41 E9/L (ref 1.5–4)
LYMPHOCYTES NFR BLD: 21.5 % (ref 20–42)
MCH RBC QN AUTO: 28.2 PG (ref 26–35)
MCHC RBC AUTO-ENTMCNC: 30.7 % (ref 32–34.5)
MCV RBC AUTO: 91.7 FL (ref 80–99.9)
MONOCYTES # BLD: 0.84 E9/L (ref 0.1–0.95)
MONOCYTES NFR BLD: 12.8 % (ref 2–12)
NEUTROPHILS # BLD: 3.95 E9/L (ref 1.8–7.3)
NEUTS SEG NFR BLD: 60.3 % (ref 43–80)
PLATELET # BLD AUTO: 364 E9/L (ref 130–450)
PMV BLD AUTO: 9.2 FL (ref 7–12)
RBC # BLD AUTO: 5.32 E12/L (ref 3.8–5.8)
WBC # BLD: 6.6 E9/L (ref 4.5–11.5)

## 2023-05-16 PROCEDURE — 99214 OFFICE O/P EST MOD 30 MIN: CPT | Performed by: INTERNAL MEDICINE

## 2023-05-16 PROCEDURE — G0402 INITIAL PREVENTIVE EXAM: HCPCS | Performed by: INTERNAL MEDICINE

## 2023-05-16 PROCEDURE — 1123F ACP DISCUSS/DSCN MKR DOCD: CPT | Performed by: INTERNAL MEDICINE

## 2023-05-16 RX ORDER — TAMSULOSIN HYDROCHLORIDE 0.4 MG/1
0.8 CAPSULE ORAL NIGHTLY
COMMUNITY
Start: 2023-02-09

## 2023-05-16 ASSESSMENT — PATIENT HEALTH QUESTIONNAIRE - PHQ9
SUM OF ALL RESPONSES TO PHQ QUESTIONS 1-9: 0
2. FEELING DOWN, DEPRESSED OR HOPELESS: 0
SUM OF ALL RESPONSES TO PHQ QUESTIONS 1-9: 0
1. LITTLE INTEREST OR PLEASURE IN DOING THINGS: 0
SUM OF ALL RESPONSES TO PHQ QUESTIONS 1-9: 0
SUM OF ALL RESPONSES TO PHQ9 QUESTIONS 1 & 2: 0
SUM OF ALL RESPONSES TO PHQ QUESTIONS 1-9: 0

## 2023-05-16 ASSESSMENT — LIFESTYLE VARIABLES
HOW OFTEN DO YOU HAVE A DRINK CONTAINING ALCOHOL: MONTHLY OR LESS
HOW MANY STANDARD DRINKS CONTAINING ALCOHOL DO YOU HAVE ON A TYPICAL DAY: 1 OR 2

## 2023-05-16 NOTE — PROGRESS NOTES
Medicare Annual Wellness Visit    Rhonda Rodriguez is here for Medicare AWV    Assessment & Plan    Recommendations for Preventive Services Due: see orders and patient instructions/AVS.  Recommended screening schedule for the next 5-10 years is provided to the patient in written form: see Patient Instructions/AVS.     No follow-ups on file. Subjective       Patient's complete Health Risk Assessment and screening values have been reviewed and are found in Flowsheets. The following problems were reviewed today and where indicated follow up appointments were made and/or referrals ordered. Positive Risk Factor Screenings with Interventions:                 Weight and Activity:  Physical Activity: Insufficiently Active    Days of Exercise per Week: 3 days    Minutes of Exercise per Session: 30 min     On average, how many days per week do you engage in moderate to strenuous exercise (like a brisk walk)?: 3 days  Have you lost any weight without trying in the past 3 months?: No  There is no height or weight on file to calculate BMI. (!) Abnormal    Obesity Interventions:  Patient advised to follow-up in this office for further evaluation and treatment                               Objective   There were no vitals filed for this visit. There is no height or weight on file to calculate BMI. No Known Allergies  Prior to Visit Medications    Medication Sig Taking? Authorizing Provider   tamsulosin (FLOMAX) 0.4 MG capsule Take 2 capsules by mouth nightly  Historical Provider, MD Regalado (Including outside providers/suppliers regularly involved in providing care):   Patient Care Team:  Oksana Randolph MD as PCP - General (Internal Medicine)  Oksana Randolph MD as PCP - Empaneled Provider     Reviewed and updated this visit:       Aretha Caruso was seen today for medicare aw.     Diagnoses and all orders for this visit:    Encounter for subsequent annual wellness visit in Medicare patient            Oksana Randolph

## 2023-05-16 NOTE — PROGRESS NOTES
Patient is on active surveillance for prostate cancer by [facility]. He is status post brachytherapy. His PSA numbers look very good. He is also being seen every 6 months by dermatology. He does have a history of multiple squamous cell carcinomas. We did discuss sun protection today. Patient actually has gained weight so he lost weight. We did give him some information regarding weight loss including some applications he could use. I did go over his lab work from last year. The only thing that was off was he did have some microcytosis and I will check this again. If he is microcytic the patient will need iron studies. Patient states that he did get a colonoscopy last year by Dr. Renee. He is not really having much in terms of voiding irritative symptoms. He is using Flomax. He denies cardiac or respiratory symptoms. He did travel to [place] by motorcycle. The entire trip was almost 2 months. He does occasionally have some right hip discomfort but normally is only with sitting in the truck. Other chairs etc. do not cause this symptom. His knee pain has improved with penitentiary. HPI:  ROS:  Const: General health stated as good. Eyes: Denies eye symptoms. ENMT: Denies ear symptoms. Denies nasal symptoms. CV: Denies cardiovascular symptoms. Resp: Denies respiratory symptoms. GI: Denies gastrointestinal symptoms. : Prostate cancer. See HPI. Radiation oncology notes reviewed from [facility]. Musculo: Reports arthritis. occasional knee pain especially with working on his knees such as crawling in  a duct. Skin: Skin care every 6 months by dermatology. History of spinal carcinoma. Neuro: Denies neurologic symptoms. Psych: Denies psychiatric symptoms. Endocrine: Denies endocrine symptoms. Hema/Lymph: Denies hematologic symptoms. Denies lymphatic symptoms. No current outpatient medications on file.   Allergies: NKDA  PMH:  Problem List: Allergic contact dermatitis due to

## 2023-06-06 ENCOUNTER — OFFICE VISIT (OUTPATIENT)
Dept: FAMILY MEDICINE CLINIC | Age: 68
End: 2023-06-06
Payer: MEDICARE

## 2023-06-06 VITALS
RESPIRATION RATE: 20 BRPM | OXYGEN SATURATION: 97 % | WEIGHT: 222 LBS | SYSTOLIC BLOOD PRESSURE: 110 MMHG | HEART RATE: 98 BPM | HEIGHT: 69 IN | DIASTOLIC BLOOD PRESSURE: 68 MMHG | TEMPERATURE: 97.5 F | BODY MASS INDEX: 32.88 KG/M2

## 2023-06-06 DIAGNOSIS — J40 SINOBRONCHITIS: Primary | ICD-10-CM

## 2023-06-06 DIAGNOSIS — R05.3 PERSISTENT COUGH: ICD-10-CM

## 2023-06-06 DIAGNOSIS — J32.9 SINOBRONCHITIS: Primary | ICD-10-CM

## 2023-06-06 PROCEDURE — 1123F ACP DISCUSS/DSCN MKR DOCD: CPT | Performed by: EMERGENCY MEDICINE

## 2023-06-06 PROCEDURE — 99213 OFFICE O/P EST LOW 20 MIN: CPT | Performed by: EMERGENCY MEDICINE

## 2023-06-06 RX ORDER — BENZONATATE 200 MG/1
200 CAPSULE ORAL 3 TIMES DAILY PRN
Qty: 30 CAPSULE | Refills: 0 | Status: SHIPPED | OUTPATIENT
Start: 2023-06-06 | End: 2023-06-13

## 2023-06-06 RX ORDER — AMOXICILLIN AND CLAVULANATE POTASSIUM 875; 125 MG/1; MG/1
1 TABLET, FILM COATED ORAL 2 TIMES DAILY
Qty: 20 TABLET | Refills: 0 | Status: SHIPPED | OUTPATIENT
Start: 2023-06-06 | End: 2023-06-16

## 2023-06-06 RX ORDER — GUAIFENESIN 600 MG/1
600 TABLET, EXTENDED RELEASE ORAL 2 TIMES DAILY
Qty: 30 TABLET | Refills: 0 | Status: SHIPPED | OUTPATIENT
Start: 2023-06-06 | End: 2023-06-21

## 2023-06-06 ASSESSMENT — ENCOUNTER SYMPTOMS
NAUSEA: 0
BACK PAIN: 0
SORE THROAT: 0
DIARRHEA: 0
COUGH: 1
EYE REDNESS: 0
EYE PAIN: 0
SHORTNESS OF BREATH: 0
EYE DISCHARGE: 0
WHEEZING: 0
ABDOMINAL PAIN: 0
VOMITING: 0
SINUS PRESSURE: 1

## 2023-06-06 NOTE — PROGRESS NOTES
visit:    Sinobronchitis  -     amoxicillin-clavulanate (AUGMENTIN) 875-125 MG per tablet; Take 1 tablet by mouth 2 times daily for 10 days  -     benzonatate (TESSALON) 200 MG capsule; Take 1 capsule by mouth 3 times daily as needed for Cough    Persistent cough  -     guaiFENesin (MUCINEX) 600 MG extended release tablet; Take 1 tablet by mouth 2 times daily for 15 days         Discussed symptomatic treatments with the patient today. Return if symptoms worsen or fail to improve. Red flag symptoms were also discussed with the patient today. If symptoms worsen the patient is to go directly to the emergency department for reevaluation and treatment. Pt verbalizes understanding and is in agreement with plan of care. All questions answered.       New Medications     New Prescriptions    AMOXICILLIN-CLAVULANATE (AUGMENTIN) 875-125 MG PER TABLET    Take 1 tablet by mouth 2 times daily for 10 days    BENZONATATE (TESSALON) 200 MG CAPSULE    Take 1 capsule by mouth 3 times daily as needed for Cough    GUAIFENESIN (MUCINEX) 600 MG EXTENDED RELEASE TABLET    Take 1 tablet by mouth 2 times daily for 15 days       Electronically signed by Art Roman DO   DD: 6/6/23

## 2023-10-02 LAB — PSA SERPL-MCNC: 0.13 NG/ML (ref 0–4)

## 2024-04-16 LAB — PSA SERPL-MCNC: 0.11 NG/ML (ref 0–4)

## 2024-05-21 ENCOUNTER — OFFICE VISIT (OUTPATIENT)
Dept: FAMILY MEDICINE CLINIC | Age: 69
End: 2024-05-21
Payer: MEDICARE

## 2024-05-21 VITALS
BODY MASS INDEX: 32.78 KG/M2 | WEIGHT: 222 LBS | HEART RATE: 62 BPM | TEMPERATURE: 98.8 F | OXYGEN SATURATION: 99 % | SYSTOLIC BLOOD PRESSURE: 110 MMHG | DIASTOLIC BLOOD PRESSURE: 70 MMHG

## 2024-05-21 DIAGNOSIS — M17.0 PRIMARY OSTEOARTHRITIS OF BOTH KNEES: Primary | ICD-10-CM

## 2024-05-21 DIAGNOSIS — E66.09 CLASS 1 OBESITY DUE TO EXCESS CALORIES WITHOUT SERIOUS COMORBIDITY WITH BODY MASS INDEX (BMI) OF 31.0 TO 31.9 IN ADULT: ICD-10-CM

## 2024-05-21 DIAGNOSIS — Z00.00 ENCOUNTER FOR SUBSEQUENT ANNUAL WELLNESS VISIT IN MEDICARE PATIENT: ICD-10-CM

## 2024-05-21 DIAGNOSIS — R71.8 MICROCYTOSIS: ICD-10-CM

## 2024-05-21 DIAGNOSIS — Z00.00 MEDICARE ANNUAL WELLNESS VISIT, SUBSEQUENT: Primary | ICD-10-CM

## 2024-05-21 DIAGNOSIS — C61 PROSTATE CANCER (HCC): ICD-10-CM

## 2024-05-21 PROCEDURE — 99213 OFFICE O/P EST LOW 20 MIN: CPT | Performed by: INTERNAL MEDICINE

## 2024-05-21 PROCEDURE — 1123F ACP DISCUSS/DSCN MKR DOCD: CPT | Performed by: INTERNAL MEDICINE

## 2024-05-21 PROCEDURE — G0439 PPPS, SUBSEQ VISIT: HCPCS | Performed by: INTERNAL MEDICINE

## 2024-05-21 ASSESSMENT — PATIENT HEALTH QUESTIONNAIRE - PHQ9
SUM OF ALL RESPONSES TO PHQ QUESTIONS 1-9: 0
1. LITTLE INTEREST OR PLEASURE IN DOING THINGS: NOT AT ALL
SUM OF ALL RESPONSES TO PHQ9 QUESTIONS 1 & 2: 0
2. FEELING DOWN, DEPRESSED OR HOPELESS: NOT AT ALL
SUM OF ALL RESPONSES TO PHQ QUESTIONS 1-9: 0

## 2024-05-21 NOTE — PATIENT INSTRUCTIONS
information.      Personalized Preventive Plan for Obey Rodriguez - 5/21/2024  Medicare offers a range of preventive health benefits. Some of the tests and screenings are paid in full while other may be subject to a deductible, co-insurance, and/or copay.    Some of these benefits include a comprehensive review of your medical history including lifestyle, illnesses that may run in your family, and various assessments and screenings as appropriate.    After reviewing your medical record and screening and assessments performed today your provider may have ordered immunizations, labs, imaging, and/or referrals for you.  A list of these orders (if applicable) as well as your Preventive Care list are included within your After Visit Summary for your review.    Other Preventive Recommendations:    A preventive eye exam performed by an eye specialist is recommended every 1-2 years to screen for glaucoma; cataracts, macular degeneration, and other eye disorders.  A preventive dental visit is recommended every 6 months.  Try to get at least 150 minutes of exercise per week or 10,000 steps per day on a pedometer .  Order or download the FREE \"Exercise & Physical Activity: Your Everyday Guide\" from [employer]. Call 1-666.258.5058 or search [employer] online.  You need 9749-1703 mg of calcium and 7904-1672 IU of vitamin D per day. It is possible to meet your calcium requirement with diet alone, but a vitamin D supplement is usually necessary to meet this goal.  When exposed to the sun, use a sunscreen that protects against both UVA and UVB radiation with an SPF of 30 or greater. Reapply every 2 to 3 hours or after sweating, drying off with a towel, or swimming.  Always wear a seat belt when traveling in a car. Always wear a helmet when riding a bicycle or motorcycle.

## 2024-05-21 NOTE — PROGRESS NOTES
Medicare Annual Wellness Visit    Obey Rodriguez is here for Medicare AW    Assessment & Plan     Recommendations for Preventive Services Due: see orders and patient instructions/AVS.  Recommended screening schedule for the next 5-10 years is provided to the patient in written form: see Patient Instructions/AVS.     No follow-ups on file.     Subjective       Patient's complete Health Risk Assessment and screening values have been reviewed and are found in Flowsheets. The following problems were reviewed today and where indicated follow up appointments were made and/or referrals ordered.    Positive Risk Factor Screenings with Interventions:                Activity, Diet, and Weight:  On average, how many days per week do you engage in moderate to strenuous exercise (like a brisk walk)?: 4 days  On average, how many minutes do you engage in exercise at this level?: 60 min    Do you eat balanced/healthy meals regularly?: Yes    There is no height or weight on file to calculate BMI. (!) Abnormal      Obesity Interventions:  Patient advised to follow-up in this office for further evaluation and treatment                               Objective   There were no vitals filed for this visit.   There is no height or weight on file to calculate BMI.             No Known Allergies  Prior to Visit Medications    Medication Sig Taking? Authorizing Provider   tamsulosin (FLOMAX) 0.4 MG capsule Take 2 capsules by mouth nightly  Provider, Historical, MD       [facility] (Including outside providers/suppliers regularly involved in providing care):   Patient Care Team:  Freddy Leal MD as PCP - General (Internal Medicine)  Freddy Leal MD as PCP - Empaneled Provider     Reviewed and updated this visit:          Obey was seen today for medicare awv.    Diagnoses and all orders for this visit:    Encounter for subsequent annual wellness visit in Medicare patient

## 2024-05-21 NOTE — PROGRESS NOTES
Extremities:  Crepitation of the lower extremities.  Skin: Dry and warm with no rash.  Neuro: Alert and oriented x3. Mood is normal. Affect is normal. Speech is articulate and fluent.  Reflexes: DTR's are symmetric, intact and 2+ bilaterally.  Psych: Patient's attitude is cooperative. Patient's affect is appropriate. Judgement is realistic. Insight  is appropriate.  Obey was seen today for annual exam.    Diagnoses and all orders for this visit:    Primary osteoarthritis of both knees  -     Lipid Panel; Future  -     Comprehensive Metabolic Panel; Future    Prostate cancer (HCC)  -     Lipid Panel; Future  -     Comprehensive Metabolic Panel; Future    Class 1 obesity due to excess calories without serious comorbidity with body mass index (BMI) of 31.0 to 31.9 in adult  -     Lipid Panel; Future  -     Comprehensive Metabolic Panel; Future    Microcytosis  -     CBC with Auto Differential; Future    Patient is to have lab work today.  I will let him know the results of this.  If he does have microcytosis so a iron study will be performed.  He is encouraged to lose weight and to increase exercise.

## 2024-06-11 DIAGNOSIS — M17.0 PRIMARY OSTEOARTHRITIS OF BOTH KNEES: ICD-10-CM

## 2024-06-11 DIAGNOSIS — R71.8 MICROCYTOSIS: ICD-10-CM

## 2024-06-11 DIAGNOSIS — E66.09 CLASS 1 OBESITY DUE TO EXCESS CALORIES WITHOUT SERIOUS COMORBIDITY WITH BODY MASS INDEX (BMI) OF 31.0 TO 31.9 IN ADULT: ICD-10-CM

## 2024-06-11 DIAGNOSIS — C61 PROSTATE CANCER (HCC): ICD-10-CM

## 2024-06-11 LAB
ALBUMIN: 4.2 G/DL (ref 3.5–5.2)
ALP BLD-CCNC: 98 U/L (ref 40–129)
ALT SERPL-CCNC: 14 U/L (ref 0–40)
ANION GAP SERPL CALCULATED.3IONS-SCNC: 10 MMOL/L (ref 7–16)
AST SERPL-CCNC: 18 U/L (ref 0–39)
BASOPHILS ABSOLUTE: 0.07 K/UL (ref 0–0.2)
BASOPHILS RELATIVE PERCENT: 1 % (ref 0–2)
BILIRUB SERPL-MCNC: 0.6 MG/DL (ref 0–1.2)
BUN BLDV-MCNC: 12 MG/DL (ref 6–23)
CALCIUM SERPL-MCNC: 9.3 MG/DL (ref 8.6–10.2)
CHLORIDE BLD-SCNC: 103 MMOL/L (ref 98–107)
CHOLESTEROL, TOTAL: 154 MG/DL
CO2: 27 MMOL/L (ref 22–29)
CREAT SERPL-MCNC: 0.9 MG/DL (ref 0.7–1.2)
EOSINOPHILS ABSOLUTE: 0.4 K/UL (ref 0.05–0.5)
EOSINOPHILS RELATIVE PERCENT: 6 % (ref 0–6)
GFR, ESTIMATED: >90 ML/MIN/1.73M2
GLUCOSE BLD-MCNC: 98 MG/DL (ref 74–99)
HCT VFR BLD CALC: 45 % (ref 37–54)
HDLC SERPL-MCNC: 60 MG/DL
HEMOGLOBIN: 14.2 G/DL (ref 12.5–16.5)
IMMATURE GRANULOCYTES %: 0 % (ref 0–5)
IMMATURE GRANULOCYTES ABSOLUTE: <0.03 K/UL (ref 0–0.58)
LDL CHOLESTEROL: 83 MG/DL
LYMPHOCYTES ABSOLUTE: 1.55 K/UL (ref 1.5–4)
LYMPHOCYTES RELATIVE PERCENT: 22 % (ref 20–42)
MCH RBC QN AUTO: 28.5 PG (ref 26–35)
MCHC RBC AUTO-ENTMCNC: 31.6 G/DL (ref 32–34.5)
MCV RBC AUTO: 90.4 FL (ref 80–99.9)
MONOCYTES ABSOLUTE: 1 K/UL (ref 0.1–0.95)
MONOCYTES RELATIVE PERCENT: 14 % (ref 2–12)
NEUTROPHILS ABSOLUTE: 3.97 K/UL (ref 1.8–7.3)
NEUTROPHILS RELATIVE PERCENT: 57 % (ref 43–80)
PDW BLD-RTO: 13.5 % (ref 11.5–15)
PLATELET # BLD: 359 K/UL (ref 130–450)
PMV BLD AUTO: 9.6 FL (ref 7–12)
POTASSIUM SERPL-SCNC: 4.6 MMOL/L (ref 3.5–5)
RBC # BLD: 4.98 M/UL (ref 3.8–5.8)
SODIUM BLD-SCNC: 140 MMOL/L (ref 132–146)
TOTAL PROTEIN: 7.1 G/DL (ref 6.4–8.3)
TRIGL SERPL-MCNC: 53 MG/DL
VLDLC SERPL CALC-MCNC: 11 MG/DL
WBC # BLD: 7 K/UL (ref 4.5–11.5)

## 2025-02-04 LAB — PSA SERPL-MCNC: 0.05 NG/ML (ref 0–4)

## 2025-06-10 ENCOUNTER — OFFICE VISIT (OUTPATIENT)
Dept: FAMILY MEDICINE CLINIC | Age: 70
End: 2025-06-10
Payer: MEDICARE

## 2025-06-10 VITALS
HEIGHT: 69 IN | OXYGEN SATURATION: 97 % | OXYGEN SATURATION: 97 % | DIASTOLIC BLOOD PRESSURE: 64 MMHG | DIASTOLIC BLOOD PRESSURE: 64 MMHG | SYSTOLIC BLOOD PRESSURE: 118 MMHG | SYSTOLIC BLOOD PRESSURE: 118 MMHG | RESPIRATION RATE: 14 BRPM | BODY MASS INDEX: 32.79 KG/M2 | TEMPERATURE: 97.6 F | WEIGHT: 221.4 LBS | HEART RATE: 63 BPM | BODY MASS INDEX: 32.79 KG/M2 | WEIGHT: 221.4 LBS | TEMPERATURE: 97.6 F | RESPIRATION RATE: 14 BRPM | HEART RATE: 63 BPM | HEIGHT: 69 IN

## 2025-06-10 DIAGNOSIS — M17.0 PRIMARY OSTEOARTHRITIS OF BOTH KNEES: Primary | ICD-10-CM

## 2025-06-10 DIAGNOSIS — E66.811 CLASS 1 OBESITY DUE TO EXCESS CALORIES WITHOUT SERIOUS COMORBIDITY WITH BODY MASS INDEX (BMI) OF 31.0 TO 31.9 IN ADULT: ICD-10-CM

## 2025-06-10 DIAGNOSIS — Z00.00 ENCOUNTER FOR SUBSEQUENT ANNUAL WELLNESS VISIT IN MEDICARE PATIENT: ICD-10-CM

## 2025-06-10 DIAGNOSIS — C61 PROSTATE CANCER (HCC): ICD-10-CM

## 2025-06-10 DIAGNOSIS — E66.09 CLASS 1 OBESITY DUE TO EXCESS CALORIES WITHOUT SERIOUS COMORBIDITY WITH BODY MASS INDEX (BMI) OF 31.0 TO 31.9 IN ADULT: ICD-10-CM

## 2025-06-10 DIAGNOSIS — C44.90 SKIN CANCER: ICD-10-CM

## 2025-06-10 DIAGNOSIS — Z00.00 MEDICARE ANNUAL WELLNESS VISIT, SUBSEQUENT: Primary | ICD-10-CM

## 2025-06-10 DIAGNOSIS — M17.0 PRIMARY OSTEOARTHRITIS OF BOTH KNEES: ICD-10-CM

## 2025-06-10 LAB
ALBUMIN: 4.1 G/DL (ref 3.5–5.2)
ALP BLD-CCNC: 98 U/L (ref 40–129)
ALT SERPL-CCNC: 18 U/L (ref 0–50)
ANION GAP SERPL CALCULATED.3IONS-SCNC: 9 MMOL/L (ref 7–16)
AST SERPL-CCNC: 25 U/L (ref 0–50)
BASOPHILS ABSOLUTE: 0.07 K/UL (ref 0–0.2)
BASOPHILS RELATIVE PERCENT: 1 % (ref 0–2)
BILIRUB SERPL-MCNC: 0.6 MG/DL (ref 0–1.2)
BUN BLDV-MCNC: 12 MG/DL (ref 8–23)
CALCIUM SERPL-MCNC: 9.3 MG/DL (ref 8.8–10.2)
CHLORIDE BLD-SCNC: 104 MMOL/L (ref 98–107)
CHOLESTEROL, TOTAL: 147 MG/DL
CO2: 25 MMOL/L (ref 22–29)
CREAT SERPL-MCNC: 0.9 MG/DL (ref 0.7–1.2)
EOSINOPHILS ABSOLUTE: 0.24 K/UL (ref 0.05–0.5)
EOSINOPHILS RELATIVE PERCENT: 4 % (ref 0–6)
GFR, ESTIMATED: >90 ML/MIN/1.73M2
GLUCOSE BLD-MCNC: 93 MG/DL (ref 74–99)
HCT VFR BLD CALC: 46.1 % (ref 37–54)
HDLC SERPL-MCNC: 61 MG/DL
HEMOGLOBIN: 14.3 G/DL (ref 12.5–16.5)
IMMATURE GRANULOCYTES %: 0 % (ref 0–5)
IMMATURE GRANULOCYTES ABSOLUTE: 0.03 K/UL (ref 0–0.58)
LDL CHOLESTEROL: 77 MG/DL
LYMPHOCYTES ABSOLUTE: 1.73 K/UL (ref 1.5–4)
LYMPHOCYTES RELATIVE PERCENT: 26 % (ref 20–42)
MCH RBC QN AUTO: 27.3 PG (ref 26–35)
MCHC RBC AUTO-ENTMCNC: 31 G/DL (ref 32–34.5)
MCV RBC AUTO: 88.1 FL (ref 80–99.9)
MONOCYTES ABSOLUTE: 0.89 K/UL (ref 0.1–0.95)
MONOCYTES RELATIVE PERCENT: 13 % (ref 2–12)
NEUTROPHILS ABSOLUTE: 3.81 K/UL (ref 1.8–7.3)
NEUTROPHILS RELATIVE PERCENT: 56 % (ref 43–80)
PDW BLD-RTO: 14.2 % (ref 11.5–15)
PLATELET # BLD: 354 K/UL (ref 130–450)
PMV BLD AUTO: 9.3 FL (ref 7–12)
POTASSIUM SERPL-SCNC: 4.8 MMOL/L (ref 3.5–5.1)
RBC # BLD: 5.23 M/UL (ref 3.8–5.8)
SODIUM BLD-SCNC: 139 MMOL/L (ref 136–145)
TOTAL PROTEIN: 7 G/DL (ref 6.4–8.3)
TRIGL SERPL-MCNC: 47 MG/DL
VLDLC SERPL CALC-MCNC: 9 MG/DL
WBC # BLD: 6.8 K/UL (ref 4.5–11.5)

## 2025-06-10 PROCEDURE — 1123F ACP DISCUSS/DSCN MKR DOCD: CPT | Performed by: INTERNAL MEDICINE

## 2025-06-10 PROCEDURE — G0439 PPPS, SUBSEQ VISIT: HCPCS | Performed by: INTERNAL MEDICINE

## 2025-06-10 PROCEDURE — 99214 OFFICE O/P EST MOD 30 MIN: CPT | Performed by: INTERNAL MEDICINE

## 2025-06-10 PROCEDURE — 1159F MED LIST DOCD IN RCRD: CPT | Performed by: INTERNAL MEDICINE

## 2025-06-10 SDOH — ECONOMIC STABILITY: FOOD INSECURITY: WITHIN THE PAST 12 MONTHS, THE FOOD YOU BOUGHT JUST DIDN'T LAST AND YOU DIDN'T HAVE MONEY TO GET MORE.: NEVER TRUE

## 2025-06-10 SDOH — ECONOMIC STABILITY: FOOD INSECURITY: WITHIN THE PAST 12 MONTHS, YOU WORRIED THAT YOUR FOOD WOULD RUN OUT BEFORE YOU GOT MONEY TO BUY MORE.: NEVER TRUE

## 2025-06-10 ASSESSMENT — PATIENT HEALTH QUESTIONNAIRE - PHQ9
SUM OF ALL RESPONSES TO PHQ QUESTIONS 1-9: 0
1. LITTLE INTEREST OR PLEASURE IN DOING THINGS: NOT AT ALL
SUM OF ALL RESPONSES TO PHQ QUESTIONS 1-9: 0
SUM OF ALL RESPONSES TO PHQ QUESTIONS 1-9: 0
2. FEELING DOWN, DEPRESSED OR HOPELESS: NOT AT ALL
SUM OF ALL RESPONSES TO PHQ QUESTIONS 1-9: 0

## 2025-06-10 ASSESSMENT — LIFESTYLE VARIABLES
HOW OFTEN DO YOU HAVE A DRINK CONTAINING ALCOHOL: NEVER
HOW MANY STANDARD DRINKS CONTAINING ALCOHOL DO YOU HAVE ON A TYPICAL DAY: PATIENT DOES NOT DRINK

## 2025-06-10 NOTE — PROGRESS NOTES
Medicare Annual Wellness Visit    Obey Rodriguez is here for Medicare AWV    Assessment & Plan        No follow-ups on file.     Subjective       Patient's complete Health Risk Assessment and screening values have been reviewed and are found in Flowsheets. The following problems were reviewed today and where indicated follow up appointments were made and/or referrals ordered.    Positive Risk Factor Screenings with Interventions:                Abnormal BMI (obese):  Body mass index is 32.7 kg/m². (!) Abnormal    Interventions:  Patient advised to follow-up in this office for further evaluation and treatment           Safety:  Do you have non-slip mats or non-slip surfaces or shower bars or grab bars in your shower or bathtub?: (!) No    Interventions:  Patient advised to follow up in the office for further evaluation and treatment                   Objective   Vitals:    06/10/25 1035   BP: 118/64   BP Site: Left Upper Arm   Patient Position: Sitting   BP Cuff Size: Large Adult   Pulse: 63   Resp: 14   Temp: 97.6 °F (36.4 °C)   TempSrc: Temporal   SpO2: 97%   Weight: 100.4 kg (221 lb 6.4 oz)   Height: 1.753 m (5' 9\")      Body mass index is 32.7 kg/m².                  No Known Allergies  Prior to Visit Medications    Medication Sig Taking? Authorizing Provider   tamsulosin (FLOMAX) 0.4 MG capsule Take 2 capsules by mouth nightly Yes Provider, MD Alex Maurice (Including outside providers/suppliers regularly involved in providing care):   Patient Care Team:  Freddy Leal MD as PCP - General (Internal Medicine)  Freddy Leal MD as PCP - Empaneled Provider     Recommendations for Preventive Services Due: see orders and patient instructions/AVS.  Recommended screening schedule for the next 5-10 years is provided to the patient in written form: see Patient Instructions/AVS.     Reviewed and updated this visit:  Tobacco  Allergies  Meds                 Obey was seen today for medicare

## 2025-06-10 NOTE — PROGRESS NOTES
Patient is followed every 6 months through the [facility] for his prostate cancer.  He did have brachytherapy.  His most recent PSA is actually declining.  Patient does have some irritative voiding symptoms especially with caffeine intake.  He is on Flomax which has been quite helpful.  The patient is followed every 6 months also with dermatology.  He does have a history of squamous cell carcinoma.  Patient denies cardiac or respiratory symptoms.  He is planning a trip to [facility] this summer on his motorcycle.  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  He does have some arthritic symptomatology of his knees.  His daughter is a  and I told him that this might be helpful along with flexion and extension exercises.  Patient denies GI symptomatology including change in bowel habits.  Last colonoscopy was in 2022.  HPI:  ROS:  Const: General health stated as good.  Eyes: Denies eye symptoms.  ENMT: Denies ear symptoms. Denies nasal symptoms.  CV: Denies cardiovascular symptoms.  Resp: Denies respiratory symptoms.  GI: Denies gastrointestinal symptoms.  : Prostate cancer.  See HPI.  Radiation oncology notes reviewed from [facility].  Musculo: Reports arthritis. occasional knee pain especially with working on his knees such as crawling in  a duct.  Skin: Skin care every 6 months by dermatology.  History of carcinoma.  Neuro: Denies neurologic symptoms.  Psych: Denies psychiatric symptoms.  Endocrine: Denies endocrine symptoms.  Hema/Lymph: Denies hematologic symptoms. Denies lymphatic symptoms.    Current Outpatient Medications:     tamsulosin (FLOMAX) 0.4 MG capsule, Take 2 capsules by mouth nightly, Disp: , Rfl:   Allergies: NKDA  PMH:  Problem List: Allergic contact dermatitis due to plants, except food  Health Maintenance:  Tetanus Immunization - (1/26/2019)  Colonoscopy - (2/28/2011) referred back 5/11/2022  Colonoscopy Screening -

## 2025-06-11 ENCOUNTER — RESULTS FOLLOW-UP (OUTPATIENT)
Dept: FAMILY MEDICINE CLINIC | Age: 70
End: 2025-06-11